# Patient Record
Sex: MALE
[De-identification: names, ages, dates, MRNs, and addresses within clinical notes are randomized per-mention and may not be internally consistent; named-entity substitution may affect disease eponyms.]

---

## 2023-01-19 ENCOUNTER — DASHBOARD ENCOUNTERS (OUTPATIENT)
Age: 33
End: 2023-01-19

## 2023-01-19 ENCOUNTER — OFFICE VISIT (OUTPATIENT)
Dept: URBAN - METROPOLITAN AREA CLINIC 80 | Facility: CLINIC | Age: 33
End: 2023-01-19
Payer: COMMERCIAL

## 2023-01-19 VITALS
WEIGHT: 186.4 LBS | HEIGHT: 71 IN | HEART RATE: 85 BPM | TEMPERATURE: 97.3 F | DIASTOLIC BLOOD PRESSURE: 90 MMHG | BODY MASS INDEX: 26.1 KG/M2 | SYSTOLIC BLOOD PRESSURE: 124 MMHG

## 2023-01-19 DIAGNOSIS — R19.4 CHANGE IN BOWEL HABIT: ICD-10-CM

## 2023-01-19 DIAGNOSIS — R19.7 DIARRHEA, UNSPECIFIED TYPE: ICD-10-CM

## 2023-01-19 PROCEDURE — 99204 OFFICE O/P NEW MOD 45 MIN: CPT | Performed by: PHYSICIAN ASSISTANT

## 2023-01-19 RX ORDER — FLUOXETINE 20 MG/1
1 CAPSULE CAPSULE ORAL ONCE A DAY
Status: ACTIVE | COMMUNITY

## 2023-01-19 NOTE — HPI-TODAY'S VISIT:
Pt presents stating he has had an increased in BM over the past 2-3 months  normal bowel habit was 1-2 BM a day - usually in am  now he has 5-7+ stools a day he is terrified of driving because constantly looking for a BM he thinks he saw some BRB in his stool one time - he had some beets before and thinks may have been that black tarry stools happened a few weeks ago  stopped drinking coffee - was taking psyllium husk - stopped that - trying to fiber count he states " I am pretty neurotic so I wonder if this is me making myself worse" no real abd pain - sometimes a tingling feeling but hard to explain no nausea or emesis no fevers or chills Symptoms started out of the blue - he moved here from [place] in Feb - this started prior to moving and he wonders if the move and stress caused this or not no family hx colon ca, polyps, IBD no unintentional weight loss

## 2023-01-27 LAB
CALPROTECTIN, STOOL - QDX: (no result)
GASTROINTESTINAL PATHOGEN: (no result)
PANCREATICELASTASE ELISA, STOOL: (no result)
STOOL, WHITE BLOOD CELLS: (no result)